# Patient Record
Sex: MALE | URBAN - METROPOLITAN AREA
[De-identification: names, ages, dates, MRNs, and addresses within clinical notes are randomized per-mention and may not be internally consistent; named-entity substitution may affect disease eponyms.]

---

## 2021-12-14 ENCOUNTER — APPOINTMENT (OUTPATIENT)
Dept: URBAN - METROPOLITAN AREA CLINIC 270 | Age: 51
Setting detail: DERMATOLOGY
End: 2021-12-24

## 2021-12-14 DIAGNOSIS — L30.9 DERMATITIS, UNSPECIFIED: ICD-10-CM

## 2021-12-14 PROCEDURE — OTHER ADDITIONAL NOTES: OTHER

## 2021-12-14 PROCEDURE — 99204 OFFICE O/P NEW MOD 45 MIN: CPT | Mod: 25

## 2021-12-14 PROCEDURE — OTHER VENIPUNCTURE: OTHER

## 2021-12-14 PROCEDURE — 36415 COLL VENOUS BLD VENIPUNCTURE: CPT

## 2021-12-14 PROCEDURE — OTHER ORDER TESTS: OTHER

## 2021-12-14 ASSESSMENT — LOCATION SIMPLE DESCRIPTION DERM
LOCATION SIMPLE: RIGHT THIGH
LOCATION SIMPLE: LEFT THIGH
LOCATION SIMPLE: RIGHT POSTERIOR THIGH
LOCATION SIMPLE: RIGHT FOREARM
LOCATION SIMPLE: LEFT POSTERIOR THIGH
LOCATION SIMPLE: LEFT ELBOW

## 2021-12-14 ASSESSMENT — LOCATION DETAILED DESCRIPTION DERM
LOCATION DETAILED: LEFT ANTERIOR PROXIMAL THIGH
LOCATION DETAILED: RIGHT DISTAL POSTERIOR THIGH
LOCATION DETAILED: RIGHT ANTERIOR PROXIMAL THIGH
LOCATION DETAILED: LEFT PROXIMAL POSTERIOR THIGH
LOCATION DETAILED: RIGHT VENTRAL PROXIMAL FOREARM
LOCATION DETAILED: LEFT ANTECUBITAL SKIN

## 2021-12-14 ASSESSMENT — BSA RASH: BSA RASH: 74

## 2021-12-14 ASSESSMENT — LOCATION ZONE DERM
LOCATION ZONE: LEG
LOCATION ZONE: ARM
LOCATION ZONE: ARM

## 2021-12-14 ASSESSMENT — SEVERITY ASSESSMENT: SEVERITY: SEVERE

## 2021-12-14 NOTE — HPI: SKIN LESIONS
How Severe Is Your Skin Lesion?: severe
Have Your Skin Lesions Been Treated?: been treated
Is This A New Presentation, Or A Follow-Up?: Skin Lesions
Additional History: Pt states rash started 3 weeks ago on hands then rash has spread throughout whole body. Pt went to [facility]. Pt states they told him it is not bacterial.
When Was It Treated?: 12/08/21

## 2021-12-14 NOTE — PROCEDURE: ADDITIONAL NOTES
Additional Notes: Through exam done in clinic. Erythematous, scaling papules coalescing into patches. Very prurtic, pt reports burning and difffisulty walking because he reports feels like a sunburn. \\nUpon further evaluation, scaling and swelling was noted to testicles and scaling noted to mouth. Pt denies any blisters, change in medication, but reports daily drinking. \\nHe denies any fever, or joint pain. He stresses the pain and itching involved. Fine scale easily flakes off, he was seen by ER a few days ago, went under observation, started on a small (4mg) pred dose pack, clindamycin, and doxy. And was discharged home. He report rash worsening after taking antibiotics. \\nRash present for 3 weeks total. \\nSPoke in depth with patient and came to the conclusion that given the widespread, pain rash he is experiencing he would be best served going to [facility] ER, this was a derm consult could be done. \\nPt agree, i called report to the charge nurse about patients condition. \\nSPoke with Dr. Mathew in the ER and he reports that he sent patient to [facility] for derm consult and wanted to send him by ambulance, pt is self pay and refused. Additional Notes: Through exam done in clinic. Erythematous, scaling papules coalescing into patches. Very prurtic, pt reports burning and difffisulty walking because he reports feels like a sunburn. \\nUpon further evaluation, scaling and swelling was noted to testicles and scaling noted to mouth. Pt denies any blisters, change in medication, but reports daily drinking. \\nHe denies any fever, or joint pain. He stresses the pain and itching involved. Fine scale easily flakes off, he was seen by ER a few days ago, went under observation, started on a small (4mg) pred dose pack, clindamycin, and doxy. And was discharged home. He report rash worsening after taking antibiotics. \\nRash present for 3 weeks total. \\nSPoke in depth with patient and came to the conclusion that given the widespread, pain rash he is experiencing he would be best served going to [facility] ER, this was a derm consult could be done. \\nPt agree, i called report to the charge nurse about patients condition. \\nSPoke with Dr. Mathew in the ER and he reports that he sent patient to [facility] for derm consult and wanted to send him by ambulance, pt is self pay and refused.